# Patient Record
Sex: FEMALE | Race: WHITE | NOT HISPANIC OR LATINO | Employment: FULL TIME | URBAN - METROPOLITAN AREA
[De-identification: names, ages, dates, MRNs, and addresses within clinical notes are randomized per-mention and may not be internally consistent; named-entity substitution may affect disease eponyms.]

---

## 2017-01-12 ENCOUNTER — OFFICE VISIT (OUTPATIENT)
Dept: INTERNAL MEDICINE | Facility: CLINIC | Age: 32
End: 2017-01-12
Payer: COMMERCIAL

## 2017-01-12 VITALS
BODY MASS INDEX: 20.51 KG/M2 | HEART RATE: 76 BPM | HEIGHT: 66 IN | TEMPERATURE: 98 F | WEIGHT: 127.63 LBS | OXYGEN SATURATION: 99 % | DIASTOLIC BLOOD PRESSURE: 84 MMHG | SYSTOLIC BLOOD PRESSURE: 102 MMHG

## 2017-01-12 DIAGNOSIS — Z00.00 HEALTHCARE MAINTENANCE: ICD-10-CM

## 2017-01-12 DIAGNOSIS — R42 DIZZINESS: Primary | ICD-10-CM

## 2017-01-12 PROCEDURE — 99999 PR PBB SHADOW E&M-NEW PATIENT-LVL III: CPT | Mod: PBBFAC,,, | Performed by: PHYSICIAN ASSISTANT

## 2017-01-12 PROCEDURE — 1159F MED LIST DOCD IN RCRD: CPT | Mod: S$GLB,,, | Performed by: PHYSICIAN ASSISTANT

## 2017-01-12 PROCEDURE — 99203 OFFICE O/P NEW LOW 30 MIN: CPT | Mod: S$GLB,,, | Performed by: PHYSICIAN ASSISTANT

## 2017-01-12 RX ORDER — MULTIVITAMIN
1 TABLET ORAL DAILY
COMMUNITY

## 2017-01-12 NOTE — PATIENT INSTRUCTIONS
Eating a High-Fiber Diet  Fiber is what gives strength and structure to plants. Most grains, beans, vegetables, and fruits contain fiber. Foods rich in fiber are often low in calories and fat, and they fill you up more. They may also reduce your risks for certain health problems. To find out the amount of fiber in canned, packaged, or frozen foods, read the Nutrition Facts label. It tells you how much fiber is in a serving.    Types of fiber and their benefits  There are two types of fiber: insoluble and soluble. They both aid digestion and help you maintain a healthy weight.  · Insoluble fiber. This is found in whole grains, cereals, certain fruits and vegetables such as apple skin, corn, and carrots. Insoluble fiber may prevent constipation and reduce the risk for certain types of cancer.  · Soluble fiber. This type of fiber is in oats, beans, and certain fruits and vegetables such as strawberries and peas. Soluble fiber can reduce cholesterol, which may help lower the risk for heart disease. It also helps control blood sugar levels.  Look for high-fiber foods  Try these foods to add fiber to your diet:  · Whole-grain breads and cereals. Try to eat 6 to 8 ounces a day. Include wheat and oat bran cereals, whole-wheat muffins or toast, and corn tortillas in your meals.  · Fruits. Try to eat 2 cups a day. Apples, oranges, strawberries, pears, and bananas are good sources. (Note: Fruit juice is low in fiber.)  · Vegetables. Try to eat at least 2.5 cups a day. Add asparagus, carrots, broccoli, peas, and corn to your meals.  · Beans. One cup of cooked lentils gives you over 15 grams of fiber. Try navy beans, lentils, and chickpeas.  · Seeds. A small handful of seeds gives you about 3 grams of fiber. Try sunflower seeds.  Keep track of your fiber  Keep track of how much fiber you eat. Start by reading food labels. Then eat a variety of foods high in fiber. As you begin to eat more fiber, ask your healthcare provider  how much water you should be drinking to keep your digestive system working smoothly.  You should aim for a certain amount of fiber in your diet each day. If you are a woman, that amount is between 25 and 28 grams per day. Men should aim for 30 to 33 grams per day. After age 50, your daily fiber needs drop to 22 grams for women and 28 grams for men.  Before you reach for the fiber supplements, think about this. Fiber is found naturally in healthy whole foods. It gives you that feeling of fullness after you eat. Taking fiber supplements or eating fiber-enriched foods will not give you this full feeling.  Your fiber intake is a good measure for the quality of your overall diet. If you are missing out on your daily amount of fiber, you may be lacking other important nutrients as well.  © 8849-2483 [employer]. [address] [place] [address], [place] [address]. All rights reserved. This information is not intended as a substitute for professional medical care. Always follow your healthcare professional's instructions.

## 2017-01-12 NOTE — PROGRESS NOTES
Subjective:       Patient ID: Isha Fiore is a 31 y.o. female.    Chief Complaint: Annual Exam; Dizziness; and Migraine    HPI Comments: Has seen a neurologist in the past, had MRI and there were no acute findings.     Headache    This is a new problem. The current episode started more than 1 year ago. The problem occurs daily. The problem has been unchanged. The pain is located in the right unilateral region. Radiates to: right eye. The pain quality is similar to prior headaches. The quality of the pain is described as squeezing. Associated symptoms include dizziness, ear pain, eye watering, photophobia and vomiting. Pertinent negatives include no abdominal pain, abnormal behavior, anorexia, back pain, blurred vision, coughing, drainage, eye pain, eye redness, facial sweating, fever, hearing loss, insomnia, loss of balance, muscle aches, nausea, neck pain, numbness, phonophobia, rhinorrhea, scalp tenderness, seizures, sinus pressure, sore throat, swollen glands, tinnitus, visual change, weakness or weight loss. Nothing aggravates the symptoms. She has tried NSAIDs (fluids) for the symptoms. The treatment provided mild relief. There is no history of cancer, cluster headaches, hypertension, immunosuppression, migraine headaches, migraines in the family, obesity, pseudotumor cerebri, recent head traumas, sinus disease or TMJ.      Here today for a well check.   Works out and eats healthy.   This morning had an episode of blurred vision (peripheral) followed by generalized, followed by dizziness and nausea. She had a protein shake for breakfast and a light workout that morning. No deviation from her normal routine. She was sitting at her computer beginning her day at work when the spell happened. She was looking at her phone at the time. This spell has never happened before and lasted 45 minutes. Improved with time. Pt states that she had a chocolate bar and some juice to see if that would help. Hasn't happened  again. No associated headache reported. Pt states she drinks A LOT OF WATER.    Denies any significant depression or anxiety.   Sleep is good. Mostly feels rested.  No triggers for headaches, they come at different times of the day. Has tried altering diet and eliminating caffeine from diet, but nothing has made a difference.   No PCP.     Family History   Problem Relation Age of Onset    Diabetes Father     Cancer Maternal Aunt     Cancer Maternal Grandfather     Cancer Paternal Grandmother     Diabetes Paternal Grandmother     Cancer Paternal Grandfather      Review of Systems   Constitutional: Negative for activity change, appetite change, chills, diaphoresis, fatigue, fever, unexpected weight change and weight loss.   HENT: Positive for ear pain. Negative for congestion, hearing loss, postnasal drip, rhinorrhea, sinus pressure, sore throat, tinnitus, trouble swallowing and voice change.    Eyes: Positive for photophobia. Negative for blurred vision, pain, redness and visual disturbance.   Respiratory: Negative.  Negative for cough, choking, chest tightness and shortness of breath.    Cardiovascular: Negative for chest pain, palpitations and leg swelling.   Gastrointestinal: Positive for vomiting. Negative for abdominal distention, abdominal pain, anorexia, blood in stool, constipation, diarrhea and nausea.   Endocrine: Negative for cold intolerance, heat intolerance, polydipsia and polyuria.   Genitourinary: Negative.  Negative for difficulty urinating and frequency.   Musculoskeletal: Negative for arthralgias, back pain, gait problem, joint swelling, myalgias and neck pain.   Skin: Negative for color change, pallor, rash and wound.   Neurological: Positive for dizziness and headaches. Negative for tremors, seizures, syncope, facial asymmetry, speech difficulty, weakness, light-headedness, numbness and loss of balance.   Hematological: Negative for adenopathy.   Psychiatric/Behavioral: Negative for  "behavioral problems, confusion, self-injury, sleep disturbance and suicidal ideas. The patient is not nervous/anxious and does not have insomnia.          Visit Vitals    /84 (BP Location: Right arm, Patient Position: Sitting, BP Method: Manual)    Pulse 76    Temp 97.8 °F (36.6 °C) (Tympanic)    Ht 5' 6" (1.676 m)    Wt 57.9 kg (127 lb 10.3 oz)    LMP 01/04/2017 (Approximate)    SpO2 99%    BMI 20.6 kg/m2     Objective:      Physical Exam   Constitutional: She is oriented to person, place, and time. She appears well-developed and well-nourished. She is cooperative.   HENT:   Head: Normocephalic and atraumatic.   Right Ear: External ear normal.   Left Ear: External ear normal.   Nose: Nose normal.   Mouth/Throat: Oropharynx is clear and moist.   Eyes: Conjunctivae and EOM are normal. Pupils are equal, round, and reactive to light. Right eye exhibits no discharge. Left eye exhibits no discharge. No scleral icterus.   Neck: Normal range of motion. Neck supple. Normal carotid pulses and no JVD present. Carotid bruit is not present. No edema present. No thyromegaly present.   Cardiovascular: Normal rate, regular rhythm, normal heart sounds and intact distal pulses.  Exam reveals no gallop and no friction rub.    No murmur heard.  Pulmonary/Chest: Effort normal and breath sounds normal. No accessory muscle usage. No respiratory distress. She has no wheezes. She has no rales. She exhibits no tenderness.   Abdominal: Soft. Bowel sounds are normal. She exhibits no distension and no mass. There is no tenderness. There is no rebound and no guarding.   Musculoskeletal: Normal range of motion. She exhibits no edema, tenderness or deformity.   Lymphadenopathy:     She has no cervical adenopathy.   Neurological: She is alert and oriented to person, place, and time. She has normal reflexes. She displays normal reflexes. No cranial nerve deficit or sensory deficit. She exhibits normal muscle tone. Coordination normal. "   Skin: Skin is warm, dry and intact. No rash noted. No cyanosis or erythema. No pallor. Nails show no clubbing.   Psychiatric: She has a normal mood and affect. Her behavior is normal. Judgment and thought content normal.   Nursing note and vitals reviewed.        Assessment:       1. Dizziness    2. Healthcare maintenance        Plan:   Dizziness  -     CBC auto differential; Future; Expected date: 1/12/17  -     Comprehensive metabolic panel; Future  -     Vitamin D; Future; Expected date: 1/12/17  -     TSH; Future  -     Hemoglobin A1c; Future; Expected date: 1/12/17  -     T4, free; Future; Expected date: 1/12/17  -     T3; Future; Expected date: 1/12/17    Healthcare maintenance  -     Lipid panel; Future; Expected date: 1/12/17    -will have pt see Dr. Noonan to establish care    Return if symptoms worsen or fail to improve.

## 2017-01-12 NOTE — MR AVS SNAPSHOT
[facility]  [address] [facility] [address] [facility] [place] [address]  Phone: [phone]  Fax: [phone]                  Isha Fiore   2017 4:20 PM   Office Visit    Description:  Female : 1985   Provider:  Milla Solis PA-C   Department:  [facility] - Internal Medicine           Reason for Visit     Annual Exam     Dizziness     Migraine           Diagnoses this Visit        Comments    Dizziness    -  Primary     Healthcare maintenance                To Do List           Future Appointments        Provider Department Dept Phone    2017 9:10 AM LABORATORY, [facility] [phone]    2017 8:20 AM Brielle Noonan MD [facility] [phone]      Goals (5 Years of Data)     None      Follow-Up and Disposition     Return if symptoms worsen or fail to improve.      [facility]     [facility] On [facility] Nurse Care Line -  Assistance  Registered nurses in the [facility] provide clinical advisement, health education, appointment booking, and other advisory services.  Call for this free service at [phone].             Medications           Message regarding Medications     Verify the changes and/or additions to your medication regime listed below are the same as discussed with your clinician today.  If any of these changes or additions are incorrect, please notify your healthcare provider.             Verify that the below list of medications is an accurate representation of the medications you are currently taking.  If none reported, the list may be blank. If incorrect, please contact your healthcare provider. Carry this list with you in case of emergency.           Current Medications     multivitamin (ONE DAILY MULTIVITAMIN) per tablet Take 1 tablet by mouth once daily.           Clinical Reference Information           Vital Signs - Last Recorded  Most recent update: 2017  4:40 PM by Farhana Condon LPN    BP  "Pulse Temp Ht    102/84 (BP Location: Right arm, Patient Position: Sitting, BP Method: Manual) 76 97.8 °F (36.6 °C) (Tympanic) 5' 6" (1.676 m)    Wt LMP SpO2 BMI    57.9 kg (127 lb 10.3 oz) 01/04/2017 (Approximate) 99% 20.6 kg/m2      Blood Pressure          Most Recent Value    BP  102/84      Allergies as of 1/12/2017     No Known Allergies      Immunizations Administered on Date of Encounter - 1/12/2017     None      Orders Placed During Today's Visit     Future Labs/Procedures Expected by Expires    CBC auto differential  1/12/2017 1/12/2018    Hemoglobin A1c  1/12/2017 3/13/2018    Lipid panel  1/12/2017 1/12/2018    T3  1/12/2017 3/13/2018    T4, free  1/12/2017 3/13/2018    Vitamin D  1/12/2017 1/12/2018    Comprehensive metabolic panel  As directed 1/12/2018    TSH  As directed 3/13/2018      [facility] Sign-Up     Activating your [facility] account is as easy as 1-2-3!     1) Visit [email or web address], select Sign Up Now, enter this activation code and your date of birth, then select Next.  [email or web address]  Expires: 2/26/2017  5:09 PM      2) Create a username and password to use when you visit [facility] in the future and select a security question in case you lose your password and select Next.    3) Enter your e-mail address and click Sign Up!    Additional Information  If you have questions, please e-mail [email or web address] or call [phone] to talk to our [facility] staff. Remember, [facility] is NOT to be used for urgent needs. For medical emergencies, dial 911.         Instructions      Eating a High-Fiber Diet  Fiber is what gives strength and structure to plants. Most grains, beans, vegetables, and fruits contain fiber. Foods rich in fiber are often low in calories and fat, and they fill you up more. They may also reduce your risks for certain health problems. To find out the amount of fiber in canned, packaged, or frozen foods, read the Nutrition Facts label. It tells you how much fiber is in a " serving.    Types of fiber and their benefits  There are two types of fiber: insoluble and soluble. They both aid digestion and help you maintain a healthy weight.  · Insoluble fiber. This is found in whole grains, cereals, certain fruits and vegetables such as apple skin, corn, and carrots. Insoluble fiber may prevent constipation and reduce the risk for certain types of cancer.  · Soluble fiber. This type of fiber is in oats, beans, and certain fruits and vegetables such as strawberries and peas. Soluble fiber can reduce cholesterol, which may help lower the risk for heart disease. It also helps control blood sugar levels.  Look for high-fiber foods  Try these foods to add fiber to your diet:  · Whole-grain breads and cereals. Try to eat 6 to 8 ounces a day. Include wheat and oat bran cereals, whole-wheat muffins or toast, and corn tortillas in your meals.  · Fruits. Try to eat 2 cups a day. Apples, oranges, strawberries, pears, and bananas are good sources. (Note: Fruit juice is low in fiber.)  · Vegetables. Try to eat at least 2.5 cups a day. Add asparagus, carrots, broccoli, peas, and corn to your meals.  · Beans. One cup of cooked lentils gives you over 15 grams of fiber. Try navy beans, lentils, and chickpeas.  · Seeds. A small handful of seeds gives you about 3 grams of fiber. Try sunflower seeds.  Keep track of your fiber  Keep track of how much fiber you eat. Start by reading food labels. Then eat a variety of foods high in fiber. As you begin to eat more fiber, ask your healthcare provider how much water you should be drinking to keep your digestive system working smoothly.  You should aim for a certain amount of fiber in your diet each day. If you are a woman, that amount is between 25 and 28 grams per day. Men should aim for 30 to 33 grams per day. After age 50, your daily fiber needs drop to 22 grams for women and 28 grams for men.  Before you reach for the fiber supplements, think about this. Fiber is  found naturally in healthy whole foods. It gives you that feeling of fullness after you eat. Taking fiber supplements or eating fiber-enriched foods will not give you this full feeling.  Your fiber intake is a good measure for the quality of your overall diet. If you are missing out on your daily amount of fiber, you may be lacking other important nutrients as well.  © 8282-5678 [employer]. [address] [place] [address], [place] [address]. All rights reserved. This information is not intended as a substitute for professional medical care. Always follow your healthcare professional's instructions.

## 2017-01-13 ENCOUNTER — LAB VISIT (OUTPATIENT)
Dept: LAB | Facility: HOSPITAL | Age: 32
End: 2017-01-13
Attending: FAMILY MEDICINE
Payer: COMMERCIAL

## 2017-01-13 DIAGNOSIS — R42 DIZZINESS: ICD-10-CM

## 2017-01-13 DIAGNOSIS — Z00.00 HEALTHCARE MAINTENANCE: ICD-10-CM

## 2017-01-13 LAB
25(OH)D3+25(OH)D2 SERPL-MCNC: 27 NG/ML
ALBUMIN SERPL BCP-MCNC: 4.3 G/DL
ALP SERPL-CCNC: 40 U/L
ALT SERPL W/O P-5'-P-CCNC: 21 U/L
ANION GAP SERPL CALC-SCNC: 8 MMOL/L
AST SERPL-CCNC: 51 U/L
BASOPHILS # BLD AUTO: 0.03 K/UL
BASOPHILS NFR BLD: 0.5 %
BILIRUB SERPL-MCNC: 0.4 MG/DL
BUN SERPL-MCNC: 15 MG/DL
CALCIUM SERPL-MCNC: 9.3 MG/DL
CHLORIDE SERPL-SCNC: 108 MMOL/L
CHOLEST/HDLC SERPL: 2.8 {RATIO}
CO2 SERPL-SCNC: 25 MMOL/L
CREAT SERPL-MCNC: 0.8 MG/DL
DIFFERENTIAL METHOD: NORMAL
EOSINOPHIL # BLD AUTO: 0.1 K/UL
EOSINOPHIL NFR BLD: 1.2 %
ERYTHROCYTE [DISTWIDTH] IN BLOOD BY AUTOMATED COUNT: 12.2 %
EST. GFR  (AFRICAN AMERICAN): >60 ML/MIN/1.73 M^2
EST. GFR  (NON AFRICAN AMERICAN): >60 ML/MIN/1.73 M^2
GLUCOSE SERPL-MCNC: 95 MG/DL
HCT VFR BLD AUTO: 38.7 %
HDL/CHOLESTEROL RATIO: 35.9 %
HDLC SERPL-MCNC: 167 MG/DL
HDLC SERPL-MCNC: 60 MG/DL
HGB BLD-MCNC: 13 G/DL
LDLC SERPL CALC-MCNC: 101 MG/DL
LYMPHOCYTES # BLD AUTO: 1.8 K/UL
LYMPHOCYTES NFR BLD: 31.8 %
MCH RBC QN AUTO: 30.8 PG
MCHC RBC AUTO-ENTMCNC: 33.6 %
MCV RBC AUTO: 92 FL
MONOCYTES # BLD AUTO: 0.5 K/UL
MONOCYTES NFR BLD: 9.2 %
NEUTROPHILS # BLD AUTO: 3.3 K/UL
NEUTROPHILS NFR BLD: 57.3 %
NONHDLC SERPL-MCNC: 107 MG/DL
PLATELET # BLD AUTO: 290 K/UL
PMV BLD AUTO: 10.8 FL
POTASSIUM SERPL-SCNC: 4.5 MMOL/L
PROT SERPL-MCNC: 7.3 G/DL
RBC # BLD AUTO: 4.22 M/UL
SODIUM SERPL-SCNC: 141 MMOL/L
T3 SERPL-MCNC: 101 NG/DL
T4 FREE SERPL-MCNC: 1.03 NG/DL
TRIGL SERPL-MCNC: 30 MG/DL
TSH SERPL DL<=0.005 MIU/L-ACNC: 1.83 UIU/ML
WBC # BLD AUTO: 5.76 K/UL

## 2017-01-13 PROCEDURE — 84480 ASSAY TRIIODOTHYRONINE (T3): CPT

## 2017-01-13 PROCEDURE — 80053 COMPREHEN METABOLIC PANEL: CPT

## 2017-01-13 PROCEDURE — 36415 COLL VENOUS BLD VENIPUNCTURE: CPT | Mod: PO

## 2017-01-13 PROCEDURE — 85025 COMPLETE CBC W/AUTO DIFF WBC: CPT

## 2017-01-13 PROCEDURE — 80061 LIPID PANEL: CPT

## 2017-01-13 PROCEDURE — 84439 ASSAY OF FREE THYROXINE: CPT

## 2017-01-13 PROCEDURE — 82306 VITAMIN D 25 HYDROXY: CPT

## 2017-01-13 PROCEDURE — 83036 HEMOGLOBIN GLYCOSYLATED A1C: CPT

## 2017-01-13 PROCEDURE — 84443 ASSAY THYROID STIM HORMONE: CPT

## 2017-01-14 LAB
ESTIMATED AVG GLUCOSE: 97 MG/DL
HBA1C MFR BLD HPLC: 5 %

## 2017-01-20 ENCOUNTER — OFFICE VISIT (OUTPATIENT)
Dept: INTERNAL MEDICINE | Facility: CLINIC | Age: 32
End: 2017-01-20
Payer: COMMERCIAL

## 2017-01-20 VITALS
WEIGHT: 127.63 LBS | OXYGEN SATURATION: 100 % | BODY MASS INDEX: 20.51 KG/M2 | SYSTOLIC BLOOD PRESSURE: 98 MMHG | DIASTOLIC BLOOD PRESSURE: 60 MMHG | HEIGHT: 66 IN | TEMPERATURE: 97 F | HEART RATE: 70 BPM

## 2017-01-20 DIAGNOSIS — H53.9 VISION DISTURBANCE: ICD-10-CM

## 2017-01-20 DIAGNOSIS — G43.709 CHRONIC MIGRAINE WITHOUT AURA WITHOUT STATUS MIGRAINOSUS, NOT INTRACTABLE: ICD-10-CM

## 2017-01-20 DIAGNOSIS — H81.10 BPPV (BENIGN PAROXYSMAL POSITIONAL VERTIGO), UNSPECIFIED LATERALITY: ICD-10-CM

## 2017-01-20 DIAGNOSIS — R42 DIZZINESS: Primary | ICD-10-CM

## 2017-01-20 PROCEDURE — 99214 OFFICE O/P EST MOD 30 MIN: CPT | Mod: S$GLB,,, | Performed by: FAMILY MEDICINE

## 2017-01-20 PROCEDURE — 1159F MED LIST DOCD IN RCRD: CPT | Mod: S$GLB,,, | Performed by: FAMILY MEDICINE

## 2017-01-20 PROCEDURE — 99999 PR PBB SHADOW E&M-EST. PATIENT-LVL IV: CPT | Mod: PBBFAC,,, | Performed by: FAMILY MEDICINE

## 2017-01-20 RX ORDER — MECLIZINE HCL 12.5 MG 12.5 MG/1
12.5 TABLET ORAL 2 TIMES DAILY PRN
Qty: 15 TABLET | Refills: 0 | Status: SHIPPED | OUTPATIENT
Start: 2017-01-20 | End: 2017-07-24

## 2017-01-20 RX ORDER — SUMATRIPTAN 50 MG/1
TABLET, FILM COATED ORAL
Qty: 15 TABLET | Refills: 0 | Status: SHIPPED | OUTPATIENT
Start: 2017-01-20 | End: 2017-07-10 | Stop reason: ALTCHOICE

## 2017-01-20 NOTE — MR AVS SNAPSHOT
[facility]  [address]  [place] [address]  Phone: [phone]  Fax: [phone]                  Isha Fiore   2017 8:20 AM   Office Visit    Description:  Female : 1985   Provider:  Brielle Noonan MD   Department:  [facility] - Internal Medicine           Reason for Visit     Establish Care           Diagnoses this Visit        Comments    Dizziness    -  Primary     BPPV (benign paroxysmal positional vertigo), unspecified laterality         Chronic migraine without aura without status migrainosus, not intractable         Vision disturbance                To Do List           Future Appointments        Provider Department Dept Phone    2017 9:00 AM Brielle Noonan MD [facility] [phone]      Goals (5 Years of Data)     None      Follow-Up and Disposition     Return in about 6 months (around 2017), or if symptoms worsen or fail to improve.       These Medications        Disp Refills Start End    meclizine (ANTIVERT) 12.5 mg tablet 15 tablet 0 2017     Take 1 tablet (12.5 mg total) by mouth 2 (two) times daily as needed. - Oral    [place]arm[place]: [facility] [place] [address] Ph #: [phone]       sumatriptan (IMITREX) 50 MG tablet 15 tablet 0 2017     Take 1 tab po at the onset of headache, can repeat x 1 in 2hrs if headache persists   Max 4 tabs /day    Pharmacy: [facility] [address] [place] [address] Ph #: [phone]         [facility]     [facility] Nurse Care Line -  Assistance  Registered nurses in the [facility] provide clinical advisement, health education, appointment booking, and other advisory services.  Call for this free service at [phone].             Medications           Message regarding Medications     Verify the changes and/or additions to your medication regime listed below are the same as discussed with your  "clinician today.  If any of these changes or additions are incorrect, please notify your healthcare provider.        START taking these NEW medications        Refills    meclizine (ANTIVERT) 12.5 mg tablet 0    Sig: Take 1 tablet (12.5 mg total) by mouth 2 (two) times daily as needed.    Class: Normal    Route: Oral    sumatriptan (IMITREX) 50 MG tablet 0    Sig: Take 1 tab po at the onset of headache, can repeat x 1 in 2hrs if headache persists   Max 4 tabs /day    Class: Print           Verify that the below list of medications is an accurate representation of the medications you are currently taking.  If none reported, the list may be blank. If incorrect, please contact your healthcare provider. Carry this list with you in case of emergency.           Current Medications     meclizine (ANTIVERT) 12.5 mg tablet Take 1 tablet (12.5 mg total) by mouth 2 (two) times daily as needed.    multivitamin (ONE DAILY MULTIVITAMIN) per tablet Take 1 tablet by mouth once daily.    sumatriptan (IMITREX) 50 MG tablet Take 1 tab po at the onset of headache, can repeat x 1 in 2hrs if headache persists   Max 4 tabs /day           Clinical Reference Information           Vital Signs - Last Recorded  Most recent update: 1/20/2017  8:53 AM by Sosa Boone LPN    BP Pulse Temp Ht Wt LMP    98/60 70 96.7 °F (35.9 °C) (Tympanic) 5' 6" (1.676 m) 57.9 kg (127 lb 10.3 oz) 01/04/2017    SpO2 BMI             100% 20.6 kg/m2         Blood Pressure          Most Recent Value    BP  98/60      Allergies as of 1/20/2017     No Known Allergies      Immunizations Administered on Date of Encounter - 1/20/2017     None      Orders Placed During Today's Visit      Normal Orders This Visit    Ambulatory referral to ENT     Ambulatory referral to Optometry       [facility] Sign-Up     Activating your [facility] account is as easy as 1-2-3!     1) Visit [email or web address], select Sign Up Now, enter this activation code and your date of birth, then select " Next.  [email or web address]  Expires: 2/26/2017  5:09 PM      2) Create a username and password to use when you visit [facility] in the future and select a security question in case you lose your password and select Next.    3) Enter your e-mail address and click Sign Up!    Additional Information  If you have questions, please e-mail [email or web address] or call [phone] to talk to our [facility] staff. Remember, [facility] is NOT to be used for urgent needs. For medical emergencies, dial 911.

## 2017-01-20 NOTE — PROGRESS NOTES
"Subjective:       Patient ID: Isha Fiore is a 31 y.o. female.    Chief Complaint: Establish Care    HPI Comments: 31-year-old female patient new to my clinic here to establish care.  Patient with no significant past medical history here, for follow-up on dizziness and recent test results.  Patient reported that she has been dizzy off and on lately, especially when bending, patient also reports that she has occasional ringing sensation to her right ear.  Patient has been having frequent headaches 2-3 times in a week, for which she has been taking over-the-counter Excedrin with some relief, reports headache mainly on the right sided occipital area.  Patient reported that she has been having occasional vision disturbances, recently had an episode of dizziness a week ago, reports that she ate breakfast prior to that morning  Patient mentioned she was at work, felt vision disturbances, followed by dizziness, denies of any loss of consciousness or palpitations.   Patient denies of any family history of migraine headaches.  Does not drink excess caffeine.             Review of Systems   Constitutional: Negative for chills, fatigue and fever.   HENT: Positive for tinnitus. Negative for congestion, hearing loss, postnasal drip and sinus pressure.    Eyes: Positive for visual disturbance.   Respiratory: Negative for shortness of breath.    Cardiovascular: Negative for chest pain and palpitations.   Gastrointestinal: Negative for abdominal pain, nausea and vomiting.   Musculoskeletal: Negative for myalgias.   Skin: Negative for rash.   Neurological: Positive for dizziness, light-headedness and headaches.   Psychiatric/Behavioral: Negative for sleep disturbance.         Visit Vitals    BP 98/60    Pulse 70    Temp 96.7 °F (35.9 °C) (Tympanic)    Ht 5' 6" (1.676 m)    Wt 57.9 kg (127 lb 10.3 oz)    LMP 01/04/2017    SpO2 100%    BMI 20.6 kg/m2     Objective:      Physical Exam   Constitutional: She is oriented to " person, place, and time. She appears well-developed and well-nourished.   HENT:   Head: Normocephalic and atraumatic.   Right Ear: External ear normal.   Left Ear: External ear normal.   Mouth/Throat: Oropharynx is clear and moist.   Pain postnasal drip noted on exam today   Cardiovascular: Normal rate, regular rhythm and normal heart sounds.    No murmur heard.  Pulmonary/Chest: Effort normal and breath sounds normal.   Abdominal: Soft. Bowel sounds are normal. There is no tenderness.   Musculoskeletal: She exhibits no edema or tenderness.   Neurological: She is alert and oriented to person, place, and time.   Skin: Skin is warm and dry. No rash noted. No erythema.   Psychiatric: She has a normal mood and affect.       Lab Visit on 01/13/2017   Component Date Value Ref Range Status    WBC 01/13/2017 5.76  3.90 - 12.70 K/uL Final    RBC 01/13/2017 4.22  4.00 - 5.40 M/uL Final    Hemoglobin 01/13/2017 13.0  12.0 - 16.0 g/dL Final    Hematocrit 01/13/2017 38.7  37.0 - 48.5 % Final    MCV 01/13/2017 92  82 - 98 fL Final    MCH 01/13/2017 30.8  27.0 - 31.0 pg Final    MCHC 01/13/2017 33.6  32.0 - 36.0 % Final    RDW 01/13/2017 12.2  11.5 - 14.5 % Final    Platelets 01/13/2017 290  150 - 350 K/uL Final    MPV 01/13/2017 10.8  9.2 - 12.9 fL Final    Gran # 01/13/2017 3.3  1.8 - 7.7 K/uL Final    Lymph # 01/13/2017 1.8  1.0 - 4.8 K/uL Final    Mono # 01/13/2017 0.5  0.3 - 1.0 K/uL Final    Eos # 01/13/2017 0.1  0.0 - 0.5 K/uL Final    Baso # 01/13/2017 0.03  0.00 - 0.20 K/uL Final    Gran% 01/13/2017 57.3  38.0 - 73.0 % Final    Lymph% 01/13/2017 31.8  18.0 - 48.0 % Final    Mono% 01/13/2017 9.2  4.0 - 15.0 % Final    Eosinophil% 01/13/2017 1.2  0.0 - 8.0 % Final    Basophil% 01/13/2017 0.5  0.0 - 1.9 % Final    Differential Method 01/13/2017 Automated   Final    Sodium 01/13/2017 141  136 - 145 mmol/L Final    Potassium 01/13/2017 4.5  3.5 - 5.1 mmol/L Final    Chloride 01/13/2017 108  95 - 110  mmol/L Final    CO2 01/13/2017 25  23 - 29 mmol/L Final    Glucose 01/13/2017 95  70 - 110 mg/dL Final    BUN, Bld 01/13/2017 15  6 - 20 mg/dL Final    Creatinine 01/13/2017 0.8  0.5 - 1.4 mg/dL Final    Calcium 01/13/2017 9.3  8.7 - 10.5 mg/dL Final    Total Protein 01/13/2017 7.3  6.0 - 8.4 g/dL Final    Albumin 01/13/2017 4.3  3.5 - 5.2 g/dL Final    Total Bilirubin 01/13/2017 0.4  0.1 - 1.0 mg/dL Final    Comment: For infants and newborns, interpretation of results should be based  on gestational age, weight and in agreement with clinical  observations.  Premature Infant recommended reference ranges:  Up to 24 hours.............<8.0 mg/dL  Up to 48 hours............<12.0 mg/dL  3-5 days..................<15.0 mg/dL  6-29 days.................<15.0 mg/dL      Alkaline Phosphatase 01/13/2017 40* 55 - 135 U/L Final    AST 01/13/2017 51* 10 - 40 U/L Final    ALT 01/13/2017 21  10 - 44 U/L Final    Anion Gap 01/13/2017 8  8 - 16 mmol/L Final    eGFR if African American 01/13/2017 >60.0  >60 mL/min/1.73 m^2 Final    eGFR if non African American 01/13/2017 >60.0  >60 mL/min/1.73 m^2 Final    Comment: Calculation used to obtain the estimated glomerular filtration  rate (eGFR) is the CKD-EPI equation. Since race is unknown   in our information system, the eGFR values for   -American and Non--American patients are given   for each creatinine result.      Vit D, 25-Hydroxy 01/13/2017 27* 30 - 96 ng/mL Final    Comment: Vitamin D deficiency.........<10 ng/mL                              Vitamin D insufficiency......10-29 ng/mL       Vitamin D sufficiency........> or equal to 30 ng/mL  Vitamin D toxicity............>100 ng/mL      TSH 01/13/2017 1.830  0.400 - 4.000 uIU/mL Final    Cholesterol 01/13/2017 167  120 - 199 mg/dL Final    Comment: The National Cholesterol Education Program (NCEP) has set the  following guidelines (reference ranges) for Cholesterol:  Optimal.....................<200  mg/dL  Borderline High.............200-239 mg/dL  High........................> or = 240 mg/dL      Triglycerides 01/13/2017 30  30 - 150 mg/dL Final    Comment: The National Cholesterol Education Program (NCEP) has set the  following guidelines (reference values) for triglycerides:  Normal......................<150 mg/dL  Borderline High.............150-199 mg/dL  High........................200-499 mg/dL      HDL 01/13/2017 60  40 - 75 mg/dL Final    Comment: The National Cholesterol Education Program (NCEP) has set the  following guidelines (reference values) for HDL Cholesterol:  Low...............<40 mg/dL  Optimal...........>60 mg/dL      LDL Cholesterol 01/13/2017 101.0  63.0 - 159.0 mg/dL Final    Comment: The National Cholesterol Education Program (NCEP) has set the  following guidelines (reference values) for LDL Cholesterol:  Optimal.......................<130 mg/dL  Borderline High...............130-159 mg/dL  High..........................160-189 mg/dL  Very High.....................>190 mg/dL      HDL/Chol Ratio 01/13/2017 35.9  20.0 - 50.0 % Final    Total Cholesterol/HDL Ratio 01/13/2017 2.8  2.0 - 5.0 Final    Non-HDL Cholesterol 01/13/2017 107  mg/dL Final    Comment: Risk category and Non-HDL cholesterol goals:  Coronary heart disease (CHD)or equivalent (10-year risk of CHD >20%):  Non-HDL cholesterol goal     <130 mg/dL  Two or more CHD risk factors and 10-year risk of CHD <= 20%:  Non-HDL cholesterol goal     <160 mg/dL  0 to 1 CHD risk factor:  Non-HDL cholesterol goal     <190 mg/dL      Hemoglobin A1C 01/13/2017 5.0  4.5 - 6.2 % Final    Comment: According to ADA guidelines, hemoglobin A1C <7.0% represents  optimal control in non-pregnant diabetic patients.  Different  metrics may apply to specific populations.   Standards of Medical Care in Diabetes - 2016.  For the purpose of screening for the presence of diabetes:  <5.7%     Consistent with the absence of diabetes  5.7-6.4%   Consistent with increasing risk for diabetes   (prediabetes)  >or=6.5%  Consistent with diabetes  Currently no consensus exists for use of hemoglobin A1C  for diagnosis of diabetes for children.      Estimated Avg Glucose 01/13/2017 97  68 - 131 mg/dL Final    Free T4 01/13/2017 1.03  0.71 - 1.51 ng/dL Final    T3, Total 01/13/2017 101  60 - 180 ng/dL Final       Assessment:       1. Dizziness    2. BPPV (benign paroxysmal positional vertigo), unspecified laterality    3. Chronic migraine without aura without status migrainosus, not intractable    4. Vision disturbance        Plan:   Dizziness  -     Ambulatory referral to ENT  Reviewed recent labs which looked stable.   Dizziness likely secondary to BPPV.  Meclizine prescribed today for symptomatic relief, encouraged to drink adequate fluids    BPPV (benign paroxysmal positional vertigo), unspecified laterality  -     meclizine (ANTIVERT) 12.5 mg tablet; Take 1 tablet (12.5 mg total) by mouth 2 (two) times daily as needed.  Dispense: 15 tablet; Refill: 0  -     Ambulatory referral to ENT  Will refer to ENT for further evaluation    Chronic migraine without aura without status migrainosus, not intractable  -     sumatriptan (IMITREX) 50 MG tablet; Take 1 tab po at the onset of headache, can repeat x 1 in 2hrs if headache persists   Max 4 tabs /day  Dispense: 15 tablet; Refill: 0  Secondary to chronic migraines will do a trial of Imitrex, and associated with vision disturbances patient will be refer to optometry  Advised to drink adequate fluids , if any worsening may refer to neurology   Vital signs showing low blood pressure , which patient reports that is normal for her .  Continue drinking adequate fluids     Vision disturbance  -     Ambulatory referral to Optometry

## 2017-02-07 ENCOUNTER — OFFICE VISIT (OUTPATIENT)
Dept: OPHTHALMOLOGY | Facility: CLINIC | Age: 32
End: 2017-02-07
Payer: COMMERCIAL

## 2017-02-07 DIAGNOSIS — H53.149 ACCOMMODATIVE ASTHENOPIA: ICD-10-CM

## 2017-02-07 DIAGNOSIS — Z13.5 GLAUCOMA SCREENING: ICD-10-CM

## 2017-02-07 DIAGNOSIS — G43.109 OCULAR MIGRAINE: Primary | ICD-10-CM

## 2017-02-07 DIAGNOSIS — H52.03 HYPEROPIA, BILATERAL: ICD-10-CM

## 2017-02-07 PROCEDURE — 99999 PR PBB SHADOW E&M-EST. PATIENT-LVL II: CPT | Mod: PBBFAC,,, | Performed by: OPTOMETRIST

## 2017-02-07 PROCEDURE — 92004 COMPRE OPH EXAM NEW PT 1/>: CPT | Mod: S$GLB,,, | Performed by: OPTOMETRIST

## 2017-02-07 PROCEDURE — 92015 DETERMINE REFRACTIVE STATE: CPT | Mod: S$GLB,,, | Performed by: OPTOMETRIST

## 2017-02-07 NOTE — PROGRESS NOTES
HPI     Eye Exam    Additional comments: new pt           Blurred Vision    Additional comments: intermediate/computer distance           Eye Problem    Additional comments: excess tearing OD>OS           Photophobia    Additional comments: driving at night           Comments   Pt's last eye exam was about 5 yrs ago elsewhere. First time seeing [facility].   Had glasses for computer work, but lens is scratched and does not wear.   Able to see if squinting. H/o migraines. C/o excess tearing, OD>OS.   Photophobia when driving at night.  Patient works at [employer] and does lots of computer work with   occasional eye fatigue.  Discontinued wear 1 yr ago after scratching the lens of her glasses.  They   were prescribed after she was referred for an eye exam following the onset   of migraine headaches.  This exam was scheduled after she had an episode of bilateral   scintillating scotoma and lightheadedness at work.  She did not have   visual symptoms previously with migraines.       Last edited by Susana Head, OD on 2/7/2017  9:33 AM. (History)            Assessment /Plan     For exam results, see Encounter Report.    Ocular migraine    Glaucoma screening    Hyperopia, bilateral    Accommodative asthenopia      Glaucoma screening and fundus exam normal.  Ocular migraine.  Fatigue secondary to hyperopia and long hours at computer.  Updated glasses prescription for near work.  Return to clinic if patient feels that the strength of the glasses needs to be adjusted.  Return to clinic 2 yrs or as needed.

## 2017-02-07 NOTE — LETTER
February 7, 2017      Brielle Noonan MD  [address] [facility] [address] [place] [address]           [facility]hthalmology  [address] [facility] [address] [place] [address]  Phone: [phone]  Fax: [phone]          Patient: Isha Fiore   MR Number: [id number]   YOB: 1985   Date of Visit: 2/7/2017       Dear Dr. Brielle Noonan:    Thank you for referring Isha Fiore to me for evaluation. Attached you will find relevant portions of my assessment and plan of care.    If you have questions, please do not hesitate to call me. I look forward to following Isha Fiore along with you.    Sincerely,    Susana Head, OD    Enclosure  CC:  No Recipients    If you would like to receive this communication electronically, please contact [email or web address] or [phone] to request more information on [employer] access.    For providers and/or their staff who would like to refer a patient to [facility], please contact us through our one-stop-shop provider referral line, [facility], at [phone].    If you feel you have received this communication in error or would no longer like to receive these types of communications, please e-mail [email or web address]

## 2017-02-07 NOTE — MR AVS SNAPSHOT
[facility]  [address] [facility] [address] [place] [address]  Phone: [phone]  Fax: [phone]                  Isha Fiore   2017 8:15 AM   Office Visit    Description:  Female : 1985   Provider:  Susana Head OD   Department:  [facility]           Reason for Visit     Eye Exam     Blurred Vision     Eye Problem     Photophobia           Diagnoses this Visit        Comments    Ocular migraine    -  Primary     Glaucoma screening         Hyperopia, bilateral         Accommodative asthenopia                To Do List           Future Appointments        Provider Department Dept Phone    2/10/2017 8:00 AM Marcia Nina CCC-ADILENE [facility] - Audiology [phone]    2/10/2017 8:45 AM Alfonso Soto MD [facility] - [phone]    2017 9:00 AM Brielle Noonan MD [facility] Internal Medicine [phone]      Goals (5 Years of Data)     None      Follow-Up and Disposition     Return in about 1 year (around 2018).      [facility] On Call     [facility] On Call Nurse Care Line -  Assistance  Registered nurses in the [facility] On Call Center provide clinical advisement, health education, appointment booking, and other advisory services.  Call for this free service at [phone].             Medications           Message regarding Medications     Verify the changes and/or additions to your medication regime listed below are the same as discussed with your clinician today.  If any of these changes or additions are incorrect, please notify your healthcare provider.             Verify that the below list of medications is an accurate representation of the medications you are currently taking.  If none reported, the list may be blank. If incorrect, please contact your healthcare provider. Carry this list with you in case of emergency.           Current Medications     meclizine (ANTIVERT) 12.5 mg tablet Take 1 tablet (12.5 mg total) by mouth 2 (two) times daily as needed.     multivitamin (ONE DAILY MULTIVITAMIN) per tablet Take 1 tablet by mouth once daily.    sumatriptan (IMITREX) 50 MG tablet Take 1 tab po at the onset of headache, can repeat x 1 in 2hrs if headache persists   Max 4 tabs /day           Clinical Reference Information           Your Vitals Were     Last Period                   01/04/2017           Allergies as of 2/7/2017     No Known Allergies      Immunizations Administered on Date of Encounter - 2/7/2017     None      Language Assistance Services     ATTENTION: Language assistance services are available, free of charge. Please call [phone].      ATENCIÓN: Si peter mcleod, tiene a be disposición servicios gratuitos de asistencia lingüística. Llame al [phone].     CHÚ Ý: N?u b?n nói Ti?ng Vi?t, có các d?ch v? h? tr? ngôn ng? mi?n phí dành cho b?n. G?i s? [phone].         [facility] complies with applicable Federal civil rights laws and does not discriminate on the basis of race, color, national origin, age, disability, or sex.

## 2017-02-17 ENCOUNTER — CLINICAL SUPPORT (OUTPATIENT)
Dept: AUDIOLOGY | Facility: CLINIC | Age: 32
End: 2017-02-17
Payer: COMMERCIAL

## 2017-02-17 ENCOUNTER — TELEPHONE (OUTPATIENT)
Dept: OTOLARYNGOLOGY | Facility: CLINIC | Age: 32
End: 2017-02-17

## 2017-02-17 DIAGNOSIS — R42 DIZZINESS: Primary | ICD-10-CM

## 2017-02-17 PROCEDURE — 92567 TYMPANOMETRY: CPT | Mod: S$GLB,,, | Performed by: AUDIOLOGIST

## 2017-02-17 PROCEDURE — 92557 COMPREHENSIVE HEARING TEST: CPT | Mod: S$GLB,,, | Performed by: AUDIOLOGIST

## 2017-02-17 NOTE — TELEPHONE ENCOUNTER
Left message advising patient of rescheduling appointment today with Dr. Soto as he is out sick. Advised that she could keep the 12:00 pm audiogram if she wished and could reschedule the visit with provider only. Asked to return our call to reschedule.

## 2017-02-17 NOTE — PROGRESS NOTES
Isha Fiore was seen 02/17/2017 for an audiological evaluation.  Patient complains of 2 episodes in January 2 weeks apart of room spinning dizziness, ringing in the RIGHT ear, a warm sensation running from the base of the RIGHT side of her neck to over her head and migraine following. Episodes lasted about 30 seconds.  She reports that she suffers from migraines often, but this is different.  No complaint of decreased hearing.    Results reveal normal hearing sensitivity 250-8000 Hz bilaterally.  Speech Reception Thresholds were  5 dBHL for the right ear and 5 dBHL for the left ear.   Word recognition scores were excellent bilaterally.  Tympanograms were Type A, normal for the right ear and Type A, normal for the left ear.    Patient was counseled on the above findings.    Recommendations include:    1.  ENT followup  2.  Wear hearing protective devices around loud noise

## 2017-07-10 ENCOUNTER — OFFICE VISIT (OUTPATIENT)
Dept: INTERNAL MEDICINE | Facility: CLINIC | Age: 32
End: 2017-07-10
Payer: COMMERCIAL

## 2017-07-10 ENCOUNTER — PATIENT OUTREACH (OUTPATIENT)
Dept: ADMINISTRATIVE | Facility: HOSPITAL | Age: 32
End: 2017-07-10

## 2017-07-10 VITALS
RESPIRATION RATE: 16 BRPM | TEMPERATURE: 99 F | HEIGHT: 66 IN | OXYGEN SATURATION: 96 % | DIASTOLIC BLOOD PRESSURE: 64 MMHG | HEART RATE: 77 BPM | BODY MASS INDEX: 20.94 KG/M2 | SYSTOLIC BLOOD PRESSURE: 84 MMHG | WEIGHT: 130.31 LBS

## 2017-07-10 DIAGNOSIS — S50.812A ABRASION OF LEFT FOREARM, INITIAL ENCOUNTER: Primary | ICD-10-CM

## 2017-07-10 DIAGNOSIS — Z12.4 SCREENING FOR CERVICAL CANCER: ICD-10-CM

## 2017-07-10 DIAGNOSIS — Z23 NEED FOR DIPHTHERIA-TETANUS-PERTUSSIS (TDAP) VACCINE: ICD-10-CM

## 2017-07-10 DIAGNOSIS — Z97.5 IUD (INTRAUTERINE DEVICE) IN PLACE: Chronic | ICD-10-CM

## 2017-07-10 PROCEDURE — 99999 PR PBB SHADOW E&M-EST. PATIENT-LVL IV: CPT | Mod: PBBFAC,,, | Performed by: FAMILY MEDICINE

## 2017-07-10 PROCEDURE — 90715 TDAP VACCINE 7 YRS/> IM: CPT | Mod: S$GLB,,, | Performed by: FAMILY MEDICINE

## 2017-07-10 PROCEDURE — 99214 OFFICE O/P EST MOD 30 MIN: CPT | Mod: S$GLB,,, | Performed by: FAMILY MEDICINE

## 2017-07-10 RX ORDER — MUPIROCIN 20 MG/G
OINTMENT TOPICAL 2 TIMES DAILY
Qty: 22 G | Refills: 0 | Status: SHIPPED | OUTPATIENT
Start: 2017-07-10 | End: 2017-07-17

## 2017-07-10 NOTE — PROGRESS NOTES
NOTE: Documentation entered by me for this encounter was done in part using speech-recognition technology. Although I have made an effort to ensure accuracy, malapropisms may exist and should be interpreted in context.  -CHARLI Marshall MD    PATIENT ID: Isha Fiore is a 31 y.o. female.    CHIEF COMPLAINT  Abrasion (Left forearm)      HISTORY OF PRESENT ILLNESS  NOTE: The following condition(s) were addressed during this encounter. The listed order is one of convenience and does not necessarily reflect the chronology of the appointment, nor the relative importance of a condition. It is possible that additional description or status details about condition(s) may be found elsewhere in the documentation for today's encounter.    Problem List Items Addressed This Visit     Abrasion of left forearm - Primary    Current Assessment & Plan     This problem is NEW TO ME. Based on the report of the patient and information available to me at present, this condition does not require further work-up at this point. We will re-assess if the condition worsens or fails to improve as expected. ONSET was a few days ago. LOCATION is dorsal left forearm. QUALITY described as itchy. SEVERITY described as MILD to MODERATE. EXACERBATING factor includes recent superficial trauma to the area from the fence of a chicken coop. She reports no fever, chills, night sweats, involuntary weight loss, swollen lymph nodes, or other rash. She says that the area is becoming more red. She has tried Neosporin, and it appears that this had no positive effect. I explained to her that her history and physical exam with her in a very low risk category for this becoming a serious problem. Nevertheless, I told her that if the prescribed treatment does not result in significant improvement over the next few days, she is to call our 24-hour line or come into the urgent care clinic for reevaluation. I educated her about proper wound hygiene, and she is  going to keep the wound clean and dry and covered (but not occluded) with Band-Aid type dressing until it is completely healed.         Relevant Medications    mupirocin (BACTROBAN) 2 % ointment    Screening for cervical cancer    Relevant Orders    Ambulatory referral to Gynecology    Need for diphtheria-tetanus-pertussis (Tdap) vaccine    Relevant Orders    (In Office Administered) Tdap Vaccine (Completed)    IUD (intrauterine device) in place (Chronic)    Relevant Orders    Ambulatory referral to Gynecology      Other Visit Diagnoses    None.       REVIEW OF SYSTEMS  CONSTITUTIONAL: No fever or chills reported.   HEMATOLOGIC: No recent blood clots or bleeding problems reported.     PHYSICAL EXAM  CONSTITUTIONAL: Vital signs (BP, P, T, RR, et al) noted. No apparent distress. Does not appear acutely ill or septic. Appears adequately hydrated.  HEENT: External ENT grossly unremarkable. Hearing grossly intact. Oropharynx moist.  PULM: Lungs clear. Breathing unlabored.  HEART: Auscultation reveals regular rate and rhythm without murmur, gallop or rub. No carotid bruit.  DERM: Skin warm and moist with normal turgor. See clinical image of dorsal left forearm below.  NEURO: Strength is grossly normal and reasonably symmetric in major muscle groups bilaterally. There are no gross focal motor deficits or gross deficits of cranial nerves III-XII.  PSYCHIATRIC: Alert and oriented x 3. Mood is grossly neutral. Affect appropriate. Judgment and insight not grossly compromised.       HEALTH MAINTENANCE - DUE SOON OR OVERDUE  Health Maintenance Due   Topic Date Due    Pap Smear with HPV Cotest  08/03/2006        HEALTH MAINTENANCE - COMPLETED  Health Maintenance Topics with due status: Not Due       Topic Last Completion Date    Influenza Vaccine 01/20/2017    TETANUS VACCINE 07/10/2017        CURRENT MEDICATION LIST REVIEWED AND UPDATED  Current Outpatient Prescriptions   Medication Sig    copper (PARAGARD T 380R) 380  "square mm IUD by Intrauterine route. Paraguard IUD inserted 2013.    meclizine (ANTIVERT) 12.5 mg tablet Take 1 tablet (12.5 mg total) by mouth 2 (two) times daily as needed.    multivitamin (ONE DAILY MULTIVITAMIN) per tablet Take 1 tablet by mouth once daily.    mupirocin (BACTROBAN) 2 % ointment Apply topically 2 (two) times daily. (for 7 days)     No current facility-administered medications for this visit.        ALLERGY LIST REVIEWED AND UPDATED  Review of patient's allergies indicates:  No Known Allergies    MEDICAL HISTORY REVIEWED AND UPDATED  She has no past medical history on file.    SURGICAL HISTORY REVIEWED AND UPDATED  She has a past surgical history that includes Mouth surgery and Intrauterine device insertion (2013).    SOCIAL HISTORY REVIEWED AND UPDATED  She reports that she has never smoked. She has never used smokeless tobacco. She reports that she drinks about 1.2 oz of alcohol per week . She reports that she does not use drugs.    ASSESSMENT and PLAN  NOTE: Unless specified otherwise, the PLAN for a listed ASSESSMENT is to continue present treatment as prescribed. The planned follow-up and additional "Patient Instructions" may also be found in the After Visit Summary printed and provided to the patient.    Abrasion of left forearm, initial encounter  -     mupirocin (BACTROBAN) 2 % ointment; Apply topically 2 (two) times daily. (for 7 days)  Dispense: 22 g; Refill: 0    Screening for cervical cancer  -     Ambulatory referral to Gynecology    IUD (intrauterine device) in place  -     Ambulatory referral to Gynecology    Need for diphtheria-tetanus-pertussis (Tdap) vaccine  -     (In Office Administered) Tdap Vaccine        Medication List with Changes/Refills   New Medications    MUPIROCIN (BACTROBAN) 2 % OINTMENT    Apply topically 2 (two) times daily. (for 7 days)   Current Medications    COPPER (PARAGARD T 380A) 380 SQUARE MM IUD    by Intrauterine route. Paraguard IUD inserted 2013.    " MECLIZINE (ANTIVERT) 12.5 MG TABLET    Take 1 tablet (12.5 mg total) by mouth 2 (two) times daily as needed.    MULTIVITAMIN (ONE DAILY MULTIVITAMIN) PER TABLET    Take 1 tablet by mouth once daily.   Discontinued Medications    SUMATRIPTAN (IMITREX) 50 MG TABLET    Take 1 tab po at the onset of headache, can repeat x 1 in 2hrs if headache persists   Max 4 tabs /day       Return if symptoms worsen or fail to improve.

## 2017-07-10 NOTE — ASSESSMENT & PLAN NOTE
This problem is NEW TO ME. Based on the report of the patient and information available to me at present, this condition does not require further work-up at this point. We will re-assess if the condition worsens or fails to improve as expected. ONSET was a few days ago. LOCATION is dorsal left forearm. QUALITY described as itchy. SEVERITY described as MILD to MODERATE. EXACERBATING factor includes recent superficial trauma to the area from the fence of a chicken coop. She reports no fever, chills, night sweats, involuntary weight loss, swollen lymph nodes, or other rash. She says that the area is becoming more red. She has tried Neosporin, and it appears that this had no positive effect. I explained to her that her history and physical exam with her in a very low risk category for this becoming a serious problem. Nevertheless, I told her that if the prescribed treatment does not result in significant improvement over the next few days, she is to call our 24-hour line or come into the urgent care clinic for reevaluation. I educated her about proper wound hygiene, and she is going to keep the wound clean and dry and covered (but not occluded) with Band-Aid type dressing until it is completely healed.

## 2017-07-24 ENCOUNTER — OFFICE VISIT (OUTPATIENT)
Dept: OBSTETRICS AND GYNECOLOGY | Facility: CLINIC | Age: 32
End: 2017-07-24
Payer: COMMERCIAL

## 2017-07-24 VITALS
HEIGHT: 66 IN | DIASTOLIC BLOOD PRESSURE: 70 MMHG | BODY MASS INDEX: 21.21 KG/M2 | SYSTOLIC BLOOD PRESSURE: 110 MMHG | WEIGHT: 132 LBS

## 2017-07-24 DIAGNOSIS — Z01.419 ENCOUNTER FOR GYNECOLOGICAL EXAMINATION WITHOUT ABNORMAL FINDING: Primary | ICD-10-CM

## 2017-07-24 DIAGNOSIS — Z30.431 IUD CHECK UP: ICD-10-CM

## 2017-07-24 PROCEDURE — 88175 CYTOPATH C/V AUTO FLUID REDO: CPT

## 2017-07-24 PROCEDURE — 99999 PR PBB SHADOW E&M-EST. PATIENT-LVL II: CPT | Mod: PBBFAC,,, | Performed by: NURSE PRACTITIONER

## 2017-07-24 PROCEDURE — 99395 PREV VISIT EST AGE 18-39: CPT | Mod: S$GLB,,, | Performed by: NURSE PRACTITIONER

## 2017-07-24 NOTE — LETTER
July 24, 2017      CHARLI Marshall MD  [address] [facility] [address]           [facility]  [address]  [place] [address]  Phone: [phone]  Fax: [phone]          Patient: Isha Fiore   MR Number: [id number]   YOB: 1985   Date of Visit: 7/24/2017       Dear Dr. CHARLI Marshall:    Thank you for referring Isha Fiore to me for evaluation. Attached you will find relevant portions of my assessment and plan of care.    If you have questions, please do not hesitate to call me. I look forward to following Isha Fiore along with you.    Sincerely,    Katelin Martinez, NP    Enclosure  CC:  No Recipients    If you would like to receive this communication electronically, please contact [email or web address] or [phone] to request more information on [employer] access.    For providers and/or their staff who would like to refer a patient to [facility], please contact us through our one-stop-shop provider referral line, [facility], at [phone].    If you feel you have received this communication in error or would no longer like to receive these types of communications, please e-mail [email or web address]

## 2017-07-24 NOTE — PROGRESS NOTES
"CC: Well woman exam    Isha Fiore is a 31 y.o. female  presents for well woman exam.  LMP: Patient's last menstrual period was 2017 (exact date)..  No issues, problems, or complaints.      History reviewed. No pertinent past medical history.  Past Surgical History:   Procedure Laterality Date    INTRAUTERINE DEVICE INSERTION      Paraguard    MOUTH SURGERY       Social History     Social History    Marital status: Single     Spouse name: N/A    Number of children: 0    Years of education: Some College     Occupational History    Not on file.     Social History Main Topics    Smoking status: Never Smoker    Smokeless tobacco: Never Used    Alcohol use 1.2 oz/week     2 Glasses of wine per week      Comment: socially    Drug use: No    Sexual activity: Yes     Partners: Male     Birth control/ protection: IUD      Comment: paragard placed in early  due out early      Other Topics Concern    Not on file     Social History Narrative    Full time employed; Single with 0 children.     Family History   Problem Relation Age of Onset    Diabetes Father     Cancer Maternal Aunt     Cancer Maternal Grandfather     Cancer Paternal Grandmother     Diabetes Paternal Grandmother     Cancer Paternal Grandfather     No Known Problems Mother      OB History      Para Term  AB Living    0 0 0 0 0 0    SAB TAB Ectopic Multiple Live Births    0 0 0 0 0          /70   Ht 5' 6" (1.676 m)   Wt 59.9 kg (132 lb)   LMP 2017 (Exact Date)   BMI 21.31 kg/m²       ROS:  GENERAL: Denies weight gain or weight loss. Feeling well overall.   SKIN: Denies rash or lesions.   HEAD: Denies head injury or headache.   NODES: Denies enlarged lymph nodes.   CHEST: Denies chest pain or shortness of breath.   CARDIOVASCULAR: Denies palpitations or left sided chest pain.   ABDOMEN: No abdominal pain, constipation, diarrhea, nausea, vomiting or rectal bleeding.   URINARY: No frequency, " dysuria, hematuria, or burning on urination.  REPRODUCTIVE: See HPI.   BREASTS: The patient performs breast self-examination and denies pain, lumps, or nipple discharge.   HEMATOLOGIC: No easy bruisability or excessive bleeding.   MUSCULOSKELETAL: Denies joint pain or swelling.   NEUROLOGIC: Denies syncope or weakness.   PSYCHIATRIC: Denies depression, anxiety or mood swings.    PHYSICAL EXAM:  APPEARANCE: Well nourished, well developed, in no acute distress.  AFFECT: WNL, alert and oriented x 3  SKIN: No acne or hirsutism  NECK: Neck symmetric without masses or thyromegaly  NODES: No inguinal, cervical, axillary, or femoral lymph node enlargement  CHEST: Good respiratory effect  ABDOMEN: Soft.  No tenderness or masses.  No hepatosplenomegaly.  No hernias.  BREASTS: Symmetrical, no skin changes or visible lesions.  No palpable masses, nipple discharge bilaterally.  PELVIC: Normal external genitalia without lesions.  Normal hair distribution.  Adequate perineal body, normal urethral meatus.  Vagina moist and well rugated without lesions or discharge.  Cervix pink - IUD strings noted and in place, without lesions, discharge or tenderness.  No significant cystocele or rectocele.  Bimanual exam shows uterus to be normal size, regular, mobile and nontender.  Adnexa without masses or tenderness.    EXTREMITIES: No edema.  Physical Exam    1. Encounter for gynecological examination without abnormal finding  Liquid-based pap smear, screening   2. IUD check up      AND PLAN:    Patient was counseled today on A.C.S. Pap guidelines and recommendations for yearly pelvic exams, mammograms and monthly self breast exams; to see her PCP for other health maintenance.

## 2017-07-31 ENCOUNTER — PATIENT MESSAGE (OUTPATIENT)
Dept: OBSTETRICS AND GYNECOLOGY | Facility: CLINIC | Age: 32
End: 2017-07-31

## 2017-08-26 NOTE — PROGRESS NOTES
SEE PATIENT PORTAL COMMENT  (This is FYI only. No further action required, unless you need to notify the patient.)  --------------------------------------------------------------------------------

## 2017-11-10 ENCOUNTER — OFFICE VISIT (OUTPATIENT)
Dept: INTERNAL MEDICINE | Facility: CLINIC | Age: 32
End: 2017-11-10
Payer: COMMERCIAL

## 2017-11-10 VITALS
HEART RATE: 86 BPM | WEIGHT: 125.44 LBS | OXYGEN SATURATION: 100 % | TEMPERATURE: 98 F | DIASTOLIC BLOOD PRESSURE: 70 MMHG | HEIGHT: 66 IN | SYSTOLIC BLOOD PRESSURE: 110 MMHG | BODY MASS INDEX: 20.16 KG/M2

## 2017-11-10 DIAGNOSIS — F41.0 PANIC ATTACK: ICD-10-CM

## 2017-11-10 DIAGNOSIS — H65.192 ACUTE EFFUSION OF LEFT EAR: ICD-10-CM

## 2017-11-10 DIAGNOSIS — R51.9 CHRONIC DAILY HEADACHE: Primary | ICD-10-CM

## 2017-11-10 DIAGNOSIS — Z09 FOLLOW-UP EXAM: ICD-10-CM

## 2017-11-10 PROBLEM — Z12.4 SCREENING FOR CERVICAL CANCER: Status: RESOLVED | Noted: 2017-07-10 | Resolved: 2017-11-10

## 2017-11-10 PROBLEM — S50.812A ABRASION OF LEFT FOREARM: Status: RESOLVED | Noted: 2017-07-10 | Resolved: 2017-11-10

## 2017-11-10 PROBLEM — Z23 NEED FOR DIPHTHERIA-TETANUS-PERTUSSIS (TDAP) VACCINE: Status: RESOLVED | Noted: 2017-07-10 | Resolved: 2017-11-10

## 2017-11-10 PROCEDURE — 99999 PR PBB SHADOW E&M-EST. PATIENT-LVL IV: CPT | Mod: PBBFAC,,, | Performed by: FAMILY MEDICINE

## 2017-11-10 PROCEDURE — 90686 IIV4 VACC NO PRSV 0.5 ML IM: CPT | Mod: S$GLB,,, | Performed by: FAMILY MEDICINE

## 2017-11-10 PROCEDURE — 99214 OFFICE O/P EST MOD 30 MIN: CPT | Mod: 25,S$GLB,, | Performed by: FAMILY MEDICINE

## 2017-11-10 PROCEDURE — 90471 IMMUNIZATION ADMIN: CPT | Mod: S$GLB,,, | Performed by: FAMILY MEDICINE

## 2017-11-10 NOTE — PROGRESS NOTES
Subjective:       Patient ID: Isha Fiore is a 32 y.o. female.    Chief Complaint: Hospital Follow Up    32-year-old female patient with Patient Active Problem List:     IUD (intrauterine device) in place  Here with complaint of chronic headaches, patient is here for follow-up from [facility] on 11/4/17.   Patient was doing part-time work at [employer], talking to one of the customer, and started feeling ringing sensation in the ears, warm sensation behind her head, and after few minutes, felt that the customer was talking gibberish patient started to have tingling and numbness sensation to her arms and legs,  felt lightheaded but did not lose consciousness felt her heart was slowing down and beating hard prompted her to go to emergency room for complete evaluation   Did not have any paperwork with her today, from the ER.   We did not receive any documentation from [facility]  Patient notified that all the workup has come back normal including CT scan.   Patient was placed scared by the incident, and would like to get further evaluated , was diagnosed as panic attack was advised to follow-up with primary care physician.  Patient reports that she's been having occasional headaches lately, occasionally has ringing sensation to the ears but not regularly, denies of any syncopal episode, nausea vomiting or vision disturbances.  Denies of any chest pain or palpitations           Review of Systems   Constitutional: Negative for activity change and unexpected weight change.   HENT: Positive for tinnitus. Negative for congestion, rhinorrhea and trouble swallowing.    Eyes: Positive for visual disturbance. Negative for discharge.   Respiratory: Positive for chest tightness. Negative for wheezing.    Cardiovascular: Positive for palpitations.   Gastrointestinal: Negative for blood in stool, constipation, diarrhea and vomiting.   Endocrine: Negative for polydipsia and polyuria.   Genitourinary:  "Negative for difficulty urinating, dysuria, hematuria and menstrual problem.   Musculoskeletal: Positive for neck pain. Negative for arthralgias and joint swelling.   Neurological: Positive for weakness and headaches.   Psychiatric/Behavioral: Positive for dysphoric mood.         /70   Pulse 86   Temp 98 °F (36.7 °C) (Tympanic)   Ht 5' 6" (1.676 m)   Wt 56.9 kg (125 lb 7.1 oz)   SpO2 100%   BMI 20.25 kg/m²   Objective:      Physical Exam   Constitutional: She is oriented to person, place, and time. She appears well-developed and well-nourished.   HENT:   Head: Normocephalic and atraumatic.   Right Ear: External ear normal.   Mouth/Throat: Oropharynx is clear and moist. No oropharyngeal exudate.   Minimal fluid noted in the left tympanic membrane   Cardiovascular: Normal rate, regular rhythm and normal heart sounds.    No murmur heard.  No carotid bruit heard bilaterally   Pulmonary/Chest: Effort normal and breath sounds normal. She has no wheezes.   Abdominal: Soft. Bowel sounds are normal. There is no tenderness.   Musculoskeletal: She exhibits no edema.   Neurological: She is alert and oriented to person, place, and time. No cranial nerve deficit. She exhibits normal muscle tone.   Skin: Skin is warm and dry. No rash noted.   Psychiatric: She has a normal mood and affect.         Assessment:       1. Chronic daily headache    2. Panic attack    3. Follow-up exam    4. Acute effusion of left ear        Plan:   Chronic daily headache  -     Ambulatory referral to Neurology  -     Ambulatory referral to ENT  Panic attack  Follow-up exam  Acute effusion of left ear  -     Ambulatory referral to ENT    Will try to obtain medical records from [facility] for complete evaluation.  Vital signs stable today  Clinical exam stable  Secondary to minimal fluid within the year advised to start taking over-the-counter Claritin for 7-10 days and drink adequate fluids.  Patient was advised to avoid " stress  [facility] for further evaluation.  Reviewed hospital records from [facility] showing normal CBC, urinalysis, CMP except low potassium at 3.3.  Normal TSH  Urine for toxicology negative EKG showing normal sinus rhythm  Normal CT head  Was sent home with diagnosis of panic attack.   Patient was advised to follow-up with neurology as referred at [facility] for further evaluation.           Other orders  -     Influenza - Quadrivalent (3 years & older) (PF)  Flu shot given today

## 2017-11-15 ENCOUNTER — OFFICE VISIT (OUTPATIENT)
Dept: OTOLARYNGOLOGY | Facility: CLINIC | Age: 32
End: 2017-11-15
Payer: COMMERCIAL

## 2017-11-15 ENCOUNTER — CLINICAL SUPPORT (OUTPATIENT)
Dept: AUDIOLOGY | Facility: CLINIC | Age: 32
End: 2017-11-15
Payer: COMMERCIAL

## 2017-11-15 VITALS
SYSTOLIC BLOOD PRESSURE: 107 MMHG | TEMPERATURE: 98 F | WEIGHT: 130.06 LBS | BODY MASS INDEX: 20.99 KG/M2 | HEART RATE: 71 BPM | DIASTOLIC BLOOD PRESSURE: 63 MMHG

## 2017-11-15 DIAGNOSIS — R51.9 WORSENING HEADACHES: ICD-10-CM

## 2017-11-15 DIAGNOSIS — R42 DIZZINESS: Primary | ICD-10-CM

## 2017-11-15 DIAGNOSIS — R51.9 DAILY HEADACHE: Primary | ICD-10-CM

## 2017-11-15 DIAGNOSIS — R42 DIZZINESS: ICD-10-CM

## 2017-11-15 DIAGNOSIS — H54.61 VISION LOSS, RIGHT EYE: ICD-10-CM

## 2017-11-15 PROCEDURE — 99204 OFFICE O/P NEW MOD 45 MIN: CPT | Mod: S$GLB,,, | Performed by: ORTHOPAEDIC SURGERY

## 2017-11-15 PROCEDURE — 92567 TYMPANOMETRY: CPT | Mod: S$GLB,,, | Performed by: AUDIOLOGIST

## 2017-11-15 PROCEDURE — 99999 PR PBB SHADOW E&M-EST. PATIENT-LVL III: CPT | Mod: PBBFAC,,, | Performed by: ORTHOPAEDIC SURGERY

## 2017-11-15 NOTE — LETTER
November 15, 2017      Brielle Noonan MD  [address]  [place] [address]           [facility]  [address]  [place] [address]  Phone: [phone]  Fax: [phone]          Patient: Isha Fiore   MR Number: [id number]   YOB: 1985   Date of Visit: 11/15/2017       Dear Dr. Brielle Noonan:    Thank you for referring Isha Fiore to me for evaluation. Attached you will find relevant portions of my assessment and plan of care.    If you have questions, please do not hesitate to call me. I look forward to following Isha Fiore along with you.    Sincerely,    Yolis Yates MD    Enclosure  CC:  No Recipients    If you would like to receive this communication electronically, please contact [email or web address] or [phone] to request more information on [employer] access.    For providers and/or their staff who would like to refer a patient to [facility], please contact us through our one-stop-shop provider referral line, [facility], at [phone].    If you feel you have received this communication in error or would no longer like to receive these types of communications, please e-mail [email or web address]

## 2017-11-15 NOTE — PROGRESS NOTES
"Subjective:       Patient ID: Isha Fiore is a 32 y.o. female.    Chief Complaint: Headache and Review tymps    Patient is a very pleasant 32 year old female here to see me today for evaluation of chronic headaches.  She says that she has daily headaches, and has had for several years.  She describes her daily headache as a feeling of pressure at the base of her skull, as well as tightness in her neck.  Recently, she had an episode where she had a severe episode that required evaluation in the ER.  During that episode, she had weakness of her extremities, and a feeling of impending doom.  She also says that she had visual changes in her right eye, where she lost peripheral vision in her right eye.  She did not have a headache during this episode, but does say that she felt as if her brain was "melting."  She was seen at [facility] ER (records have been released, not yet available), and had a CT of the head at that time that was reportedly normal.  She has no prior history of migraines.  She does have a history of mild depression, and in the ER she was told this was a panic attack. However, she does not feel that she was having any issues with increased anxiety at that time.  She denies any issues with sinusitis, and has not required any recent antibiotics for sinusitis.  She also denies any significant allergy symptoms.  She has also noted intermittent sensations of feeling as if the ground was moving, no spinning sensations.  She has seen her PCP, who felt that the episode may have been migraine related.  She was given a prescription for Imitrex, which she has tried once or twice but has not found much benefit.      Review of Systems   Constitutional: Negative for chills, fatigue, fever and unexpected weight change.   HENT: Negative for congestion, dental problem, ear discharge, ear pain, facial swelling, hearing loss, nosebleeds, postnasal drip, rhinorrhea, sinus pressure, sneezing, sore throat, tinnitus, trouble " swallowing and voice change.    Eyes: Negative for redness, itching and visual disturbance (change in right eye vision as above).   Respiratory: Negative for cough, choking, shortness of breath and wheezing.    Cardiovascular: Negative for chest pain and palpitations.   Gastrointestinal: Negative for abdominal pain.        No reflux.   Musculoskeletal: Positive for neck pain. Negative for gait problem.   Skin: Negative for rash.   Neurological: Positive for headaches. Negative for dizziness and light-headedness.       Objective:      Physical Exam   Constitutional: She is oriented to person, place, and time. She appears well-developed and well-nourished. No distress.   HENT:   Head: Normocephalic and atraumatic.   Right Ear: Tympanic membrane, external ear and ear canal normal.   Left Ear: Tympanic membrane, external ear and ear canal normal.   Nose: Nose normal. No mucosal edema, rhinorrhea, nasal deformity or septal deviation. No epistaxis. Right sinus exhibits no maxillary sinus tenderness and no frontal sinus tenderness. Left sinus exhibits no maxillary sinus tenderness and no frontal sinus tenderness.   Mouth/Throat: Uvula is midline, oropharynx is clear and moist and mucous membranes are normal. Mucous membranes are not pale and not dry. No dental caries. No oropharyngeal exudate or posterior oropharyngeal erythema.   Eyes: Conjunctivae, EOM and lids are normal. Pupils are equal, round, and reactive to light. Right eye exhibits no chemosis. Left eye exhibits no chemosis. Right conjunctiva is not injected. Left conjunctiva is not injected. No scleral icterus. Right eye exhibits normal extraocular motion and no nystagmus. Left eye exhibits normal extraocular motion and no nystagmus.   Neck: Trachea normal and phonation normal. No tracheal tenderness present. No tracheal deviation present. No thyroid mass and no thyromegaly present.   Cardiovascular: Intact distal pulses.    Pulmonary/Chest: Effort normal. No  stridor. No respiratory distress.   Abdominal: She exhibits no distension.   Lymphadenopathy:        Head (right side): No submental, no submandibular, no preauricular, no posterior auricular and no occipital adenopathy present.        Head (left side): No submental, no submandibular, no preauricular, no posterior auricular and no occipital adenopathy present.     She has no cervical adenopathy.   Neurological: She is alert and oriented to person, place, and time. No cranial nerve deficit.   Skin: Skin is warm and dry. No rash noted. No erythema.   Psychiatric: She has a normal mood and affect. Her behavior is normal.       AUDIOGRAM:   Results reveal normal hearing sensitivity 250-8000 Hz bilaterally.  Speech Reception Thresholds were  5 dBHL for the right ear and 5 dBHL for the left ear.   Word recognition scores were excellent bilaterally.  Tympanograms were Type A, normal for the right ear and Type A, normal for the left ear.        Assessment:       1. Daily headache    2. Worsening headaches    3. Vision loss, right eye    4. Dizziness        Plan:       1.  Daily headache:  We had a long discussion regarding her daily headaches, and that the episode that recently caused her to seek care in the ER was very different from her daily headaches.   We discussed that she may be having issues with daily tension headaches, but I do agree with her PCP that it sounds as if her recent severe episode was migraine related.  2.  Worsening headache:  I would recommend evaluation with Neurology, as her headaches are worsening and have not improved with oral Imitrex.  Will send referral.  I would recommend a headache journal and a trial of the migraine diet until she sees neurology.  3. Vision loss right eye, dizziness:  Due to her symptoms at her recent evaluation in the ER including dizziness, visual changes in her right eye and loss of feeling in her extremities, I would recommend an MRI of the brain.  Will order, and will  contact with results.

## 2017-11-15 NOTE — PROGRESS NOTES
Isha Fiore was seen 11/15/2017 for an audiological evaluation.  Patient complains of feeling like she was having a stroke 2 weeks ago. She felt fuzzy headed, headache, vision change, numbness in her arms and began shaking. She was taken to [facility] ER and she was told she had a panic attack.  She also reports the same symptoms she previously did 6 months ago; warm tingling in the base of her neck on the right side and things seem to shift (no spinning) in her environment.  No change in hearing sensitivity reported.    Audiogram 6 months ago, AMISH, Aleshia.    Tympanometry revealed Type A, normal in the right ear, and Type A, normal in the left ear.   Right Ear:  1.0ml@-15 daPa, with ear canal volume of:1.5  Left Ear:  1.2ml@-5daPa, with ear canal volume of: 1.3    Patient was counseled with results.

## 2017-11-20 ENCOUNTER — TELEPHONE (OUTPATIENT)
Dept: RADIOLOGY | Facility: HOSPITAL | Age: 32
End: 2017-11-20

## 2017-11-21 ENCOUNTER — PATIENT MESSAGE (OUTPATIENT)
Dept: OTOLARYNGOLOGY | Facility: CLINIC | Age: 32
End: 2017-11-21

## 2017-12-19 ENCOUNTER — TELEPHONE (OUTPATIENT)
Dept: RADIOLOGY | Facility: HOSPITAL | Age: 32
End: 2017-12-19

## 2017-12-20 ENCOUNTER — HOSPITAL ENCOUNTER (OUTPATIENT)
Dept: RADIOLOGY | Facility: HOSPITAL | Age: 32
Discharge: HOME | End: 2017-12-20
Attending: ORTHOPAEDIC SURGERY
Payer: COMMERCIAL

## 2017-12-20 PROCEDURE — 25500020 PHARM REV CODE 255: Mod: PO | Performed by: ORTHOPAEDIC SURGERY

## 2017-12-20 PROCEDURE — A9585 GADOBUTROL INJECTION: HCPCS | Mod: PO | Performed by: ORTHOPAEDIC SURGERY

## 2017-12-20 PROCEDURE — 70553 MRI BRAIN STEM W/O & W/DYE: CPT | Mod: 26,,, | Performed by: RADIOLOGY

## 2017-12-20 PROCEDURE — 70553 MRI BRAIN STEM W/O & W/DYE: CPT | Mod: TC,PO

## 2017-12-20 RX ORDER — GADOBUTROL 604.72 MG/ML
6 INJECTION INTRAVENOUS
Status: COMPLETED | OUTPATIENT
Start: 2017-12-20 | End: 2017-12-20

## 2017-12-20 RX ADMIN — GADOBUTROL 6 ML: 604.72 INJECTION INTRAVENOUS at 03:12

## 2018-01-19 ENCOUNTER — OFFICE VISIT (OUTPATIENT)
Dept: NEUROLOGY | Facility: CLINIC | Age: 33
End: 2018-01-19
Payer: COMMERCIAL

## 2018-01-19 VITALS
BODY MASS INDEX: 22.19 KG/M2 | HEIGHT: 65 IN | DIASTOLIC BLOOD PRESSURE: 80 MMHG | WEIGHT: 133.19 LBS | SYSTOLIC BLOOD PRESSURE: 114 MMHG | HEART RATE: 90 BPM

## 2018-01-19 DIAGNOSIS — G43.509 PERSISTENT MIGRAINE AURA WITHOUT CEREBRAL INFARCTION AND WITHOUT STATUS MIGRAINOSUS, NOT INTRACTABLE: ICD-10-CM

## 2018-01-19 PROCEDURE — 99999 PR PBB SHADOW E&M-EST. PATIENT-LVL III: CPT | Mod: PBBFAC,,, | Performed by: PSYCHIATRY & NEUROLOGY

## 2018-01-19 PROCEDURE — 99204 OFFICE O/P NEW MOD 45 MIN: CPT | Mod: S$GLB,,, | Performed by: PSYCHIATRY & NEUROLOGY

## 2018-01-19 RX ORDER — SUMATRIPTAN SUCCINATE 100 MG/1
TABLET ORAL
Qty: 9 TABLET | Refills: 6 | Status: SHIPPED | OUTPATIENT
Start: 2018-01-19 | End: 2018-03-05 | Stop reason: SDUPTHER

## 2018-01-19 NOTE — PATIENT INSTRUCTIONS
Migraine Headache  This often severe type of headache is different from other types of headaches in that symptoms other than pain occur with the headache. Nausea and vomiting, lightheadedness, sensitivity to light (photophobia), and other visual disturbances are common migraine symptoms. The pain may last from a few hours to several days. It is not clear why migraines occur but certain factors called triggers can raise the risk of having a migraine attack. A migraine may be triggered by emotional stress or depression, or by hormone changes during the menstrual cycle. Other triggers include birth control pills, overuse of migraine medicines, alcohol or caffeine, foods with tyramine (such as aged cheese and wine), eyestrain, weather changes, missed meals, or too little or too much sleep.  Home care  Follow these tips when taking care of yourself at home:  · Dont drive yourself home if you were given pain medicine for your headache or are having visual symptoms. Instead, have someone else drive you home. Try to sleep when you get home. You should feel much better when you wake up.  · Cold can help ease migraine symptoms. Put an ice pack on your forehead or at the base of your skull. Put heat on the back of your neck to help ease any neck spasm.  · Drink only clear liquids or eat a light diet until your symptoms get better. This will help you avoid nausea and vomiting.  How to prevent migraines  Pay attention to what seems to trigger your headache. Try to avoid the triggers when you can. If you have frequent headaches, consider keeping a headache diary. In it, write down what you were doing, feeling, or eating in the hours before each headache. Show this to your healthcare provider to help find the cause of your headaches.  If stress seems to be a trigger for your headaches, figure out what is causing stress in your life. Learn new ways to handle your stress. Ideas include regular exercise, biofeedback,  self-hypnosis, yoga, and meditation. Talk with your healthcare provider to find out more information about managing stress. Many books and digital media are also available on this subject.  Tyramine is a substance found in many foods. It can trigger a migraine in some people. These foods contain tyramine:  · Chocolate  · Yogurt  · All cheeses, but especially aged cheeses  · Smoked or pickled fish and meat, including herring, caviar, bologna, pepperoni, and salami  · Liver  · Avocados  · Bananas  · Figs  · Raisins  · Red wine  Try staying away from these foods for 1 to 2 months to see if you have fewer headaches.  How to treat future headaches  · Take time out at the first sign of a headache, if possible. Find a quiet, dark, comfortable place to sit or lie down. Let yourself relax or sleep.  · Put an ice pack on your forehead or on the area of greatest pain. A heating pad and massage may help if you are having a muscle spasm and tightness in your neck.  · If you have been prescribed a medicine to stop a migraine headache, use this at the first warning sign of the headache for best results. First signs may be an aura or pain.  · If you need to take medicine often for your migraine, talk with your healthcare provider about other ways to prevent your headaches.  Follow-up care  Follow up with your healthcare provider, or as advised. Talk with your provider if you have frequent headaches. He or she can figure out a treatment plan. Ask if you can have medicine to take at home the next time you get a bad headache. This may keep you from having to visit the emergency department in the future. You may need to see a headache specialist (neurologist) if you continue to have headaches.  When to seek medical advice  Call your healthcare provider right away if any of these occur:  · Your head pain gets worse, or doesnt get better within 24 hours  · You cant keep liquids down (repeated vomiting)  · Pain in your sinuses, ears, or  "throat  · Fever of 100.4º F (38º C) or higher, or as directed by your healthcare provider  · Stiff neck  · Extreme drowsiness, confusion, or fainting  · Dizziness, or dizziness with spinning sensation (vertigo)  · Weakness in an arm or leg, or on one side of your face  · Difficulty talking or seeing  Date Last Reviewed: 8/1/2016  © 6167-1157 [employer]. [address] [place] [address], [place] [address]. All rights reserved. This information is not intended as a substitute for professional medical care. Always follow your healthcare professional's instructions.        Migraine Headache: Stages and Treatment    A migraine headache tends to progress in stages. Learning these stages can help you better understand what is happening. Then you can learn ways to reduce pain and relieve other symptoms. Methods for relieving your symptoms include self-care and medicines.  Migraine stages  Migraines tend to progress through 4 stages. Many people don't have all stages, and stages may differ with each headache:  · Prodrome. A few hours to a day or so before the headache, you may feel tired, (yawning many times), uneasy, or mullen. You may also feel bloated or crave certain foods.  · Aura. Up to an hour before the headache starts, some migraine sufferers experience aura--flashing lights, blind spots, other vision problems, confusion, difficulty speaking, or other neurologic symptoms.  · Headache. Moderate to severe pain affects one side of the head and then can spread to both sides, often along with nausea. You may be highly sensitive to light, sound, and odors. Vomiting or diarrhea may also happen. This stage lasts 4 to 72 hours.  · Postdrome. After your headache ends, you may feel tired, achy, and "washed out." This may last for a day or so.  Self-care during a migraine  Here is what you can do:  · Use a cold compress. Wrap a thin cloth around a cold pack, a cold can of soda, or a bag of frozen vegetables. Apply this to " your temple or other pain site.  · Drink fluids. If nausea makes it hard to drink, try sucking on ice.  · Rest. If possible, lie down. Try not to bend over, as this may increase your pain. Sometimes laying in a dark quiet room can help the migraine from being aggravated.    · Try caffeine. Some people find that drinking fluids with caffeine, such as coffee or tea, helps to lessen migraine pain.  Using medicines  Work with your healthcare provider to find the right medicines for you. Medicines for migraine may relieve pain (analgesics), relieve nausea, or attack the migraine's root causes (migraine-specific medicines).  Rebound headache  Taking analgesics each day, or even several times a week, may lead to more frequent and severe headaches. These are called rebound headaches. If you think you're having rebound headaches, tell your healthcare provider. He or she can help you safely decrease your medicine. Rebound caffeine withdrawal headaches can also happen.    Date Last Reviewed: 10/9/2015  © 4617-7042 [email or web address]. [address] [place] [address], [place] [address]. All rights reserved. This information is not intended as a substitute for professional medical care. Always follow your healthcare professional's instructions.        Preventing Migraine Headaches: Triggers     Red wine is a common migraine trigger.     The first step in preventing migraines is to learn what triggers them. You may then be able to control your triggers to avoid or reduce the severity of your migraines.  Know your triggers  Be aware that you may have more than one trigger, and that some triggers may work together. Common migraine triggers include:  · Food and nutrition. Skipping meals or not drinking enough water can trigger headaches. So can certain foods, such as caffeine, monosodium glutamate (MSG), aged cheese, or sausage.  · Alcohol. Red wine and other alcoholic beverages are common migraine triggers.  · Chemicals. Scents,  cleaning products, gasoline, glue, perfume, and paint can be triggers. So can tobacco smoke, including secondhand smoke.  · Emotions. Stress can trigger headaches or make them worse once they begin.  · Sleep disruption. Staying up late, sleeping late, and traveling across time zones can disrupt your sleep cycle, triggering headaches.  · Hormones. Many women notice that migraines tend to happen at a certain point in their menstrual cycle. Birth control pills or hormone replacement therapy may also trigger migraines.  · Environment and weather. Air travel, changes in altitude, air pressure changes, hot sun, or bright or flashing lights can be triggers.  Control your triggers  These are some of the things you can do to try to control triggers:  · Avoid triggers if you can. For example, stay clear of alcohol and foods that trigger your headaches. Use unscented household products. Keep regular sleep habits. Manage stress to help control emotional triggers.  · Change your behavior at times when triggers can't be avoided. For example, make sure to get enough rest and drink plenty of water while you're traveling. Make sure to carry a hat, sunglasses, and your medicines. Be alert for migraine symptoms, so you can treat a migraine early if it happens.  Date Last Reviewed: 10/9/2015  © 0597-2508 [employer]. [address] [place] [address], [place] [address]. All rights reserved. This information is not intended as a substitute for professional medical care. Always follow your healthcare professional's instructions.        Preventing Migraine Headaches: Medicines and Lifestyle Changes     Going to bed and getting up at the same time each day, including weekends, may help prevent migraines.     A migraine is a type of severe headache. Having a migraine can be very painful. But there are steps you can take to help prevent migraines.  Medicines to help prevent migraines  · Your healthcare provider may prescribe certain  medicines to help prevent migraines. These medicines may need to be taken daily. Or they may only need to be taken at times when youre likely to have a migraine.  · Common medicines used to help prevent migraines include:  ¨ Triptans (serotonin receptor agonists)  ¨ Nonsteroidal anti-inflammatory drugs (available over-the-counter)  ¨ Beta-blockers  ¨ Anticonvulsants  ¨ Tricyclic antidepressants  ¨ Calcium channel blockers  ¨ Certain vitamins, minerals, and plant extracts  ¨ Botulinum toxin injection (Botox) for certain chronic migraines   ¨ CGRP (calcitonin gene-related peptide) agnonists are being reviewed by the Food and Drug Administration (FDA)  Lifestyle changes for long-term prevention  Here are some suggestions:  · Exercise. Regular exercise can help prevent migraines and improve your health. (If exercise triggers your migraines, talk to your healthcare provider.)  · Keep regular habits. Dont skip or delay meals. Drink plenty of water. And go to bed and get up at about the same time each day. This includes weekends.  · Try alternative treatments. These are treatments that do not involve the use of medicines or surgery. They may help relieve symptoms and prevent migraines. Some treatment options include biofeedback and acupuncture. Ask your healthcare provider to tell you more about these treatments if you have questions.  · Limit caffeine. You may find that caffeine helps relieve pain during an attack. But too much caffeine can also trigger migraines. So, limit the amount of caffeine you consume.  Date Last Reviewed: 10/11/2015  © 1220-6942 [employer]. [address] [place] [address], [place] [address]. All rights reserved. This information is not intended as a substitute for professional medical care. Always follow your healthcare professional's instructions.

## 2018-01-19 NOTE — LETTER
January 19, 2018      Yolis Yates MD  [id number] [place] [address] [address] Debbie ABREU [address]           '[facility]  [address]  [place] [address]  Phone: [phone]  Fax: [phone]          Patient: Isha Fiore   MR Number: [id number]   YOB: 1985   Date of Visit: 1/19/2018       Dear Dr. Yolis Yates:    Thank you for referring Isha Fiore to me for evaluation. Attached you will find relevant portions of my assessment and plan of care.    If you have questions, please do not hesitate to call me. I look forward to following Isha Fiore along with you.    Sincerely,    Eamon Spence MD    Enclosure  CC:  No Recipients    If you would like to receive this communication electronically, please contact [email or web address] or [phone] to request more information on [employer] access.    For providers and/or their staff who would like to refer a patient to [facility], please contact us through our one-stop-shop provider referral line, [facility], at [phone].    If you feel you have received this communication in error or would no longer like to receive these types of communications, please e-mail [email or web address]

## 2018-01-19 NOTE — PROGRESS NOTES
"This is a 32-year-old right-handed patient who indicates that she has had a history of headache that probably started in her early 20s and has continued.  The patient indicates that she has several different types of headache based on severity as well as associated symptoms.  In addition to her headache, she is also had episodes of dizziness or loss of balance that occurs transiently.    The patient indicates that her severe headaches occur about 1-2 times a month, but a more frequent and less severe headache occurs at least 2 times a week.  The indication on the patient is that her daily headache is also noted when she first awakens in the morning.  She states that she feels a sensation of discomfort in the back of the head that then is persistent and occasionally becomes worse.  When the migraine headache occurs, the pain spreads from the back of the head to the right side of the head.  This type of headache is a very painful throbbing type pain with a tingling sensation.  She also describes this as being in a "vice ."  When the headache is severe, she does experience nausea, photophobia, phonophobia, and movement intolerance.    The patient so far has utilized ibuprofen as her primary medication which she will take intermittently but not on a daily basis.  The patient states that she rarely takes medication for her daily headache which is present.  She indicates that the daily headache is a less severe headache with a pain level of 5-6/10, and is not associated with other migraine symptoms.  She is found that massage therapy will sometimes help that headache as well as physical therapy measures such as heat or cold compresses.    In November, 2017, she had awakened with a headache that was unresponsive to ibuprofen which rapidly worsened and was associated with lightheadedness but then she also had a transient loss of speech, generalized tremors and inability to walk or stand effectively.  She was taken to an " emergency room where she was examined and then released after treatment for her headache pain.  The patient states that the emergency room physician however told her that she had a panic attack.    With her history of a sensation of imbalance, she has had MRI of the brain done which did not show any abnormality.      ROS:  GENERAL: No fever, chills, fatigability or weight loss.  SKIN: No rashes, itching or changes in color or texture of skin.  HEAD: No recent head trauma.  EYES: Visual acuity fine. No photophobia, ocular pain or diplopia.  EARS: Denies ear pain, discharge  NOSE: No loss of smell, no epistaxis or postnasal drip.  MOUTH & THROAT: No hoarseness or change in voice. No excessive gum bleeding.  NODES: Denies swollen glands.  CHEST: Denies OLSON, cyanosis, wheezing, cough and sputum production.  CARDIOVASCULAR: Denies chest pain, PND, orthopnea or reduced exercise tolerance.  ABDOMEN: Appetite fine. No weight loss. Denies diarrhea, abdominal pain, hematemesis or blood in stool.  URINARY: No flank pain, dysuria or hematuria.  PERIPHERAL VASCULAR: No claudication or cyanosis.  MUSCULOSKELETAL: No joint stiffness or swelling. Denies back pain.  NEUROLOGIC: No history of seizures, paralysis, or unexplained loss of consciousness.    PAST HISTORY:  Surgery: Oral surgery  Medical: No other chronic medical illnesses  ALLERGIES: NO KNOWN DRUG ALLERGIES    FAMILY HISTORY:  The patient's mother has a history of severe chronic headache as well as chronic low back pain.  She has a younger brother and a younger sister who do not have headaches.    SOCIAL HISTORY:  The patient is single but is living with her significant other.  She is a nonsmoker.  She is working full time.  She is an avid exerciser as well as practices yoga.    PE:   VITAL SIGNS: Blood pressure 114/80, pulse 90, weight 60.4 kg, height 5 foot 5 inches, BMI 22.16  APPEARANCE: Well nourished, well developed, in no acute distress.    HEAD: Normocephalic,  atraumatic.  EYES: PERRL. EOMI.  Non-icteric sclerae.    EARS: TM's intact. Light reflex normal. No retraction or perforation.    NOSE: Mucosa pink. Airway clear.  MOUTH & THROAT: No tonsillar enlargement. No pharyngeal erythema or exudate. No stridor.  NECK: Supple. No bruits.  CHEST: Lungs clear to auscultation.  CARDIOVASCULAR: Regular rhythm without significant murmurs.  ABDOMEN: Bowel sounds normal. Not distended.   MUSCULOSKELETAL:  No bony deformity seen.  Muscle tone and muscle mass are normal in both upper and both lower extremities.  NEUROLOGIC:   Mental Status:  The patient is well oriented to person, time, place, and situation.  The patient is attentive to the environment and cooperative for the exam.  Cranial Nerves: II-XII grossly intact.  Visual acuity with near card is 20/25 left eye, right eye, and both eyes.  The patient perceives the color red to be the same shade of red with each eye.  Fundoscopic exam is normal.  No hemorrhage, exudate or papilledema is present. The extraocular muscles are intact in the cardinal directions of gaze.  No ptosis is present. Facial features are symmetrical.  Speech is normal in fluency, diction, and phrasing.  Tongue protrudes in the midline.    Gait and Station:  Romberg is negative.  Good alternate armswing with normal gait.  Patient was able to stand tandem without difficulty.  Motor:  No downdrift of either arm when held at shoulder level.  Manual muscle testing of proximal and distal muscles of both upper and lower extremities is normal. Muscle mass is normal.  Muscle tone is normal.  Sensory:  Intact both upper and lower extremities to pin prick, touch, and vibration.  Cerebellar:  Finger to nose done well.  Alternating movements intact.  No involuntary movements or tremor seen.  Reflexes:  Stretch reflexes are 2+ both upper and lower extremities.  Plantar stimulation is flexor bilaterally and no pathological reflexes are seen      ASSESSMENT:  1.  Migraine  headache without aura, with chronic daily headache suggesting chronic migraine    RECOMMENDATIONS:  1.  Discussion was held with the patient regarding the use of daily medicine for migraine prevention.  However, the patient inclines daily medication at this time although she states that she will consider this.  2.  Abortive medications were also discussed with the patient.  She had previously tried sumatriptan and 100 mg tablet and found it to relieve her very severe headache when it was present.  She would like to continue that method of treatment.  3.  The patient is not utilizing analgesics excessively and in fact, states that she would prefer not to take medication at all if possible.  4.  The patient was advised to keep an accurate headache diary and report them through the patient portal.  She is to return to neurology as needed.    This was a 65 minute visit with the patient with over 50% of the time spent counseling the patient regarding the diagnosis and management of migraine headaches.    This note is generated with speech recognition software and is subject to transcription error and sound alike phrases that may be missed by proofreading.

## 2018-02-27 ENCOUNTER — PATIENT MESSAGE (OUTPATIENT)
Dept: NEUROLOGY | Facility: CLINIC | Age: 33
End: 2018-02-27

## 2018-03-05 DIAGNOSIS — G43.509 PERSISTENT MIGRAINE AURA WITHOUT CEREBRAL INFARCTION AND WITHOUT STATUS MIGRAINOSUS, NOT INTRACTABLE: ICD-10-CM

## 2018-03-05 RX ORDER — SUMATRIPTAN SUCCINATE 100 MG/1
TABLET ORAL
Qty: 9 TABLET | Refills: 6 | Status: SHIPPED | OUTPATIENT
Start: 2018-03-05 | End: 2018-06-01 | Stop reason: SDUPTHER

## 2018-06-01 DIAGNOSIS — G43.509 PERSISTENT MIGRAINE AURA WITHOUT CEREBRAL INFARCTION AND WITHOUT STATUS MIGRAINOSUS, NOT INTRACTABLE: ICD-10-CM

## 2018-06-01 RX ORDER — SUMATRIPTAN SUCCINATE 100 MG/1
TABLET ORAL
Qty: 9 TABLET | Refills: 6 | Status: SHIPPED | OUTPATIENT
Start: 2018-06-01 | End: 2018-07-20 | Stop reason: SDUPTHER

## 2018-07-18 ENCOUNTER — PATIENT MESSAGE (OUTPATIENT)
Dept: NEUROLOGY | Facility: CLINIC | Age: 33
End: 2018-07-18

## 2018-07-18 DIAGNOSIS — G43.509 PERSISTENT MIGRAINE AURA WITHOUT CEREBRAL INFARCTION AND WITHOUT STATUS MIGRAINOSUS, NOT INTRACTABLE: ICD-10-CM

## 2018-07-20 RX ORDER — SUMATRIPTAN SUCCINATE 100 MG/1
TABLET ORAL
Qty: 9 TABLET | Refills: 6 | Status: SHIPPED | OUTPATIENT
Start: 2018-07-20 | End: 2019-06-24 | Stop reason: SDUPTHER

## 2019-06-24 DIAGNOSIS — G43.509 PERSISTENT MIGRAINE AURA WITHOUT CEREBRAL INFARCTION AND WITHOUT STATUS MIGRAINOSUS, NOT INTRACTABLE: ICD-10-CM

## 2019-06-24 RX ORDER — SUMATRIPTAN SUCCINATE 100 MG/1
TABLET ORAL
Qty: 9 TABLET | Refills: 6 | Status: SHIPPED | OUTPATIENT
Start: 2019-06-24 | End: 2019-07-19 | Stop reason: SDUPTHER

## 2019-07-19 DIAGNOSIS — G43.509 PERSISTENT MIGRAINE AURA WITHOUT CEREBRAL INFARCTION AND WITHOUT STATUS MIGRAINOSUS, NOT INTRACTABLE: ICD-10-CM

## 2019-07-19 RX ORDER — SUMATRIPTAN SUCCINATE 100 MG/1
TABLET ORAL
Qty: 9 TABLET | Refills: 6 | Status: SHIPPED | OUTPATIENT
Start: 2019-07-19 | End: 2019-10-10 | Stop reason: SDUPTHER

## 2019-10-10 DIAGNOSIS — G43.509 PERSISTENT MIGRAINE AURA WITHOUT CEREBRAL INFARCTION AND WITHOUT STATUS MIGRAINOSUS, NOT INTRACTABLE: ICD-10-CM

## 2019-10-10 RX ORDER — SUMATRIPTAN SUCCINATE 100 MG/1
TABLET ORAL
Qty: 9 TABLET | Refills: 6 | Status: SHIPPED | OUTPATIENT
Start: 2019-10-10